# Patient Record
Sex: FEMALE | Race: WHITE | NOT HISPANIC OR LATINO | Employment: OTHER | URBAN - METROPOLITAN AREA
[De-identification: names, ages, dates, MRNs, and addresses within clinical notes are randomized per-mention and may not be internally consistent; named-entity substitution may affect disease eponyms.]

---

## 2017-10-06 ENCOUNTER — ALLSCRIPTS OFFICE VISIT (OUTPATIENT)
Dept: OTHER | Facility: OTHER | Age: 82
End: 2017-10-06

## 2017-10-06 DIAGNOSIS — I48.20 CHRONIC ATRIAL FIBRILLATION (HCC): ICD-10-CM

## 2017-10-06 DIAGNOSIS — R30.9 PAINFUL MICTURITION: ICD-10-CM

## 2017-10-07 NOTE — PROGRESS NOTES
Assessment  1  Late onset Alzheimer's disease without behavioral disturbance (331 0,294 10)   (G30 1,F02 80)   2  Chronic atrial fibrillation (427 31) (I48 2)   3  Hypertension, essential (401 9) (I10)   4  Seizure disorder (345 90) (G40 909)   5  Incontinence in female (625 6) (R32)    Plan  Chronic atrial fibrillation    · (1) CBC/PLT/DIFF; Status:Active; Requested for:06Oct2017;    · (1) COMPREHENSIVE METABOLIC PANEL; Status:Active; Requested OEB:[id number];    · (1) DIGOXIN; Status:Active; Requested for:06Oct2017;    · (1) PT WITH INR; Status:Active; Requested for:06Oct2017;    · (1) TSH WITH FT4 REFLEX; Status:Active; Requested for:06Oct2017;     Discussion/Summary  Discussion Summary:   Laboratory screen to be done today  vaccine today  visit June 2018 or earlier if needed  History of Present Illness  HPI: A new patient visit  female with profound Alzheimer's dementia  of:fibrillation, rate controlled and anticoagulated  Hypertension  Seizure disorder  All of these are controlled  patient is at home with her daughter and another caretaker  The patient is DNR but not the NH  The family will treat underlying disease not chest symptoms  to the family and the caretaker, the patient is still verbal with them although she is completely mute here  ROS is not obtainable from the patient  According to the family  There been no changes  Hypertension (Follow-Up): The patient presents for follow-up of essential hypertension  The patient states she has been stable with her blood pressure control since the last visit  Comorbid Illnesses: a stroke  She has no significant interval events  Symptoms: The patient is currently asymptomatic  Dementia (Follow-Up): Her dementia has But close to being end stage  , but been stable since the last visit  She has no comorbid illnesses  Symptoms: stable memory impairment,-denies agitation,-denies aggression-and-denies wandering  Associated Symptoms: no frequent falls     Home precautions: Dementia counseling and education was reviewed with patient and caregivers, including  Social Activities: The patient is currently living with family  Atrial Fibrillation (Follow-Up): The patient presents with permanent atrial fibrillation  The treatment strategy for this patient is rate control  She states her atrial fibrillation has been stable since the last visit  She has no comorbid illnesses  She has no significant interval events  Symptoms:       Seizure Disorder (Brief): The patient is being seen for a routine clinic follow-up of seizure disorder  Active Problems  1  Active advance directive (V49 89) (Z78 9)   2  Screening for genitourinary condition (V81 6) (Z13 89)    Past Medical History  1  History of atrial fibrillation (V12 59) (Z86 79)   2  History of dementia (V11 8) (Z86 59)    Surgical History  1  History of Surgery For Relief Of Elevated Intraocular Pressure    Family History  Mother    1  Family history of Diagnosis unknown  Father    2  Family history of Diagnosis unknown  Family History Reviewed: The family history was reviewed and updated today  Social History   · Active advance directive (V49 89) (Z78 9)   · Former cigarette smoker (V15 82) (A49 687)   · No illicit drug use   · Occasional alcohol consumption (V49 89) (Z78 9)    Allergies  1  Seafood    Vitals  Signs   Recorded: [id number] 01:26PM   Temperature: 97 5 F  Heart Rate: 86  Systolic: 914  Diastolic: 80  Height: 5 ft   Weight: 111 lb   BMI Calculated: 21 68  BSA Calculated: 1 45  O2 Saturation: 100    Physical Exam    Constitutional Elderly female seated in wheelchair, nonverbal    Eyes   Pupils and irises: Equal, round and reactive to light  Pulmonary   Respiratory effort: No increased work of breathing or signs of respiratory distress  Auscultation of lungs: Clear to auscultation      Cardiovascular   Auscultation of heart: Abnormal   The heart rate was normal  The rhythm was irregularly irregular  Heart sounds: abnormal S1 was diminished-and-abnormal S2 was diminished  A grade 3 systolic murmur was heard at the RUSB  A grade 2 systolic murmur was heard at the LUSB  Systolic ejection murmur noted right and left upper sternal borders, right greater than left  Examination of extremities for edema and/or varicosities: Normal     Musculoskeletal   Gait and station: Abnormal   Gait evaluation demonstrated non-weight bearing bilaterally-and-Nonambulatory here in the office  Psychiatric   Orientation to person, place, and time: Abnormal   Mental Status: disoriented to place,-disoriented to time,-decreased attention span,-decreased concentrating ability,-decreased visual attention,-difficulty naming common objects,-difficulty repeating a phrase,-difficulty writing a sentence,-inadequate knowledge of current events,-inadequate knowledge regarding past history-and-inadequate knowledge regarding vocabulary, but-oriented to person  The patient's speech exhibited aphasia          Results/Data  *VB - Urinary Incontinence Screen (Dx Z13 89 Screen for UI) [id number] 02:09PM Aguilar [place]     Test Name Result Flag Reference   Urinary Incontinence Assessment [id number]       Falls Risk Assessment (Dx Z13 89 Screen for Neurologic Disorder) [id number] 01:24PM Adry Mcclure     Test Name Result Flag Reference   Falls Risk      No falls in the past year       Signatures   Electronically signed by : Bettey Ahumada, M D ; Oct  6 2017  3:23PM EST                       (Author)

## 2017-11-11 ENCOUNTER — LAB CONVERSION - ENCOUNTER (OUTPATIENT)
Dept: OTHER | Facility: OTHER | Age: 82
End: 2017-11-11

## 2017-11-11 LAB
BACTERIA UR QL AUTO: ABNORMAL /HPF
BILIRUB UR QL STRIP: NEGATIVE
COLOR UR: ABNORMAL
COMMENT (HISTORICAL): CLEAR
FECAL OCCULT BLOOD DIAGNOSTIC (HISTORICAL): ABNORMAL
GLUCOSE (HISTORICAL): NEGATIVE
HYALINE CASTS #/AREA URNS LPF: ABNORMAL /LPF
KETONES UR STRIP-MCNC: NEGATIVE MG/DL
LEUKOCYTE ESTERASE UR QL STRIP: ABNORMAL
NITRITE UR QL STRIP: NEGATIVE
PH UR STRIP.AUTO: 6 [PH] (ref 5–8)
PROT UR STRIP-MCNC: ABNORMAL MG/DL
RBC (HISTORICAL): ABNORMAL /HPF
SP GR UR STRIP.AUTO: 1.02 (ref 1–1.03)
SQUAMOUS EPITHELIAL CELLS (HISTORICAL): ABNORMAL /HPF
WBC # BLD AUTO: ABNORMAL /HPF

## 2018-01-13 VITALS
HEIGHT: 60 IN | TEMPERATURE: 97.5 F | SYSTOLIC BLOOD PRESSURE: 150 MMHG | HEART RATE: 86 BPM | DIASTOLIC BLOOD PRESSURE: 80 MMHG | BODY MASS INDEX: 21.79 KG/M2 | WEIGHT: 111 LBS | OXYGEN SATURATION: 100 %

## 2018-05-21 RX ORDER — LEVETIRACETAM 250 MG/1
TABLET ORAL DAILY
COMMUNITY
End: 2018-07-29 | Stop reason: SDUPTHER

## 2018-05-21 RX ORDER — METOPROLOL SUCCINATE 25 MG/1
TABLET, EXTENDED RELEASE ORAL DAILY
COMMUNITY

## 2018-05-21 RX ORDER — LATANOPROST 50 UG/ML
SOLUTION/ DROPS OPHTHALMIC DAILY
COMMUNITY

## 2018-05-21 RX ORDER — WARFARIN SODIUM 4 MG/1
TABLET ORAL
COMMUNITY

## 2018-05-21 RX ORDER — WARFARIN SODIUM 2.5 MG/1
TABLET ORAL
COMMUNITY

## 2018-05-21 RX ORDER — DIGOXIN 125 MCG
TABLET ORAL
COMMUNITY

## 2018-05-21 RX ORDER — LISINOPRIL 40 MG/1
TABLET ORAL DAILY
COMMUNITY
End: 2018-10-07 | Stop reason: SDUPTHER

## 2018-05-22 ENCOUNTER — OFFICE VISIT (OUTPATIENT)
Dept: INTERNAL MEDICINE CLINIC | Facility: CLINIC | Age: 83
End: 2018-05-22
Payer: MEDICARE

## 2018-05-22 VITALS
SYSTOLIC BLOOD PRESSURE: 118 MMHG | TEMPERATURE: 96.9 F | HEART RATE: 71 BPM | OXYGEN SATURATION: 93 % | HEIGHT: 60 IN | DIASTOLIC BLOOD PRESSURE: 74 MMHG

## 2018-05-22 DIAGNOSIS — L89.45: ICD-10-CM

## 2018-05-22 DIAGNOSIS — R13.10 DYSPHAGIA, UNSPECIFIED TYPE: Primary | ICD-10-CM

## 2018-05-22 PROBLEM — I10 HYPERTENSION, ESSENTIAL: Status: ACTIVE | Noted: 2017-10-06

## 2018-05-22 PROBLEM — I48.20 CHRONIC ATRIAL FIBRILLATION (HCC): Status: ACTIVE | Noted: 2017-10-06

## 2018-05-22 PROBLEM — G40.909 SEIZURE DISORDER (HCC): Status: ACTIVE | Noted: 2017-10-06

## 2018-05-22 PROBLEM — R32 INCONTINENCE IN FEMALE: Status: ACTIVE | Noted: 2017-10-06

## 2018-05-22 PROBLEM — G30.1 LATE ONSET ALZHEIMER'S DISEASE WITHOUT BEHAVIORAL DISTURBANCE (HCC): Status: ACTIVE | Noted: 2017-10-06

## 2018-05-22 PROBLEM — F02.80 LATE ONSET ALZHEIMER'S DISEASE WITHOUT BEHAVIORAL DISTURBANCE (HCC): Status: ACTIVE | Noted: 2017-10-06

## 2018-05-22 PROCEDURE — 99214 OFFICE O/P EST MOD 30 MIN: CPT | Performed by: PHYSICIAN ASSISTANT

## 2018-05-22 NOTE — PROGRESS NOTES
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, left buttock, and left hip, unstageable (HCC)  -     Low Air Loss Alternating Pressure Pads    Other orders  -     digoxin (LANOXIN) 0 125 mg tablet; Take by mouth every other day    -     levETIRAcetam (KEPPRA) 250 mg tablet; Take by mouth daily    -     latanoprost (XALATAN) 0 005 % ophthalmic solution; Apply to eye daily    -     lisinopril (ZESTRIL) 40 mg tablet; Take by mouth daily    -     metoprolol succinate (TOPROL-XL) 25 mg 24 hr tablet; Take by mouth daily    -     warfarin (COUMADIN) 2 5 mg tablet; Take by mouth  -     warfarin (COUMADIN) 4 mg tablet; Take by mouth        No problem-specific Assessment & Plan notes found for this encounter  Subjective:      Patient ID: Magali Hernandez is a 80 y o  female  Trouble swallowing for several months  She chokes on thin liquids and has trouble swallowing food in the since cut up to very small pieces her appetite is good she does not complain about any pain  She has severe dementia poor communication  She does not walk mostly incontinent  The following portions of the patient's history were reviewed and updated as appropriate:   She has a past medical history of Atrial fibrillation ([address] ) and Dementia ,   does not have any pertinent problems on file  ,   has a past surgical history that includes Other surgical history  ,  family history includes No Known Problems in her father and mother  ,   reports that she has quit smoking  Her smoking use included Cigarettes   She has never used smokeless tobacco  She reports that she drinks alcohol  She reports that she does not use drugs  ,  has No Known Allergies     Current Outpatient Prescriptions   Medication Sig Dispense Refill    digoxin (LANOXIN) 0 125 mg tablet Take by mouth every other day        latanoprost (XALATAN) 0 005 % ophthalmic solution Apply to eye daily        levETIRAcetam (KEPPRA) 250 mg tablet Take by mouth daily        lisinopril (ZESTRIL) 40 mg tablet Take by mouth daily        metoprolol succinate (TOPROL-XL) 25 mg 24 hr tablet Take by mouth daily        warfarin (COUMADIN) 4 mg tablet Take by mouth      warfarin (COUMADIN) 2 5 mg tablet Take by mouth       No current facility-administered medications for this visit  Review of Systems   Constitutional: Negative for activity change, appetite change, chills, diaphoresis, fatigue, fever and unexpected weight change  HENT: Negative for congestion, dental problem, drooling, ear discharge, ear pain, facial swelling, hearing loss, nosebleeds, postnasal drip, rhinorrhea, sinus pain, sinus pressure, sneezing, sore throat, tinnitus, trouble swallowing and voice change  Eyes: Negative for photophobia, pain, discharge, redness, itching and visual disturbance  Respiratory: Negative for apnea, cough, choking, chest tightness, shortness of breath, wheezing and stridor  Cardiovascular: Negative for chest pain, palpitations and leg swelling  Gastrointestinal: Negative for abdominal distention, abdominal pain, anal bleeding, blood in stool, constipation, diarrhea, nausea, rectal pain and vomiting  Endocrine: Negative for cold intolerance, heat intolerance, polydipsia, polyphagia and polyuria  Genitourinary: Negative for decreased urine volume, difficulty urinating, dysuria, enuresis, flank pain, frequency, genital sores, hematuria and urgency     Musculoskeletal: Negative for arthralgias, back pain, gait problem, joint swelling, myalgias, neck pain and neck stiffness  Skin: Positive for wound  Negative for color change, pallor and rash  Neurological: Negative for dizziness, tremors, seizures, syncope, facial asymmetry, speech difficulty, weakness, light-headedness, numbness and headaches  Hematological: Negative for adenopathy  Does not bruise/bleed easily  Psychiatric/Behavioral: Positive for confusion and decreased concentration  Negative for agitation, behavioral problems, dysphoric mood, hallucinations, self-injury, sleep disturbance and suicidal ideas  The patient is not nervous/anxious and is not hyperactive  Objective:  Vitals:    05/22/18 1352   BP: 118/74   BP Location: Right arm   Patient Position: Sitting   Pulse: 71   Temp: (!) 96 9 °F (36 1 °C)   SpO2: 93%   Height: 5' (1 524 m)     There is no height or weight on file to calculate BMI  Physical Exam   Constitutional: She is oriented to person, place, and time  She appears well-developed  Reticent stiff in a wheelchair   HENT:   Head: Normocephalic  Right Ear: External ear normal    Left Ear: External ear normal    Mouth/Throat: Oropharynx is clear and moist    Eyes: Conjunctivae are normal  Pupils are equal, round, and reactive to light  Neck: Neck supple  No thyromegaly present  Cardiovascular: Normal rate, regular rhythm, normal heart sounds and intact distal pulses  No murmur heard  Pulmonary/Chest: Effort normal and breath sounds normal  No respiratory distress  She exhibits no tenderness  Abdominal: Soft  Bowel sounds are normal  She exhibits no distension and no mass  Musculoskeletal: Normal range of motion  Neurological: She is alert and oriented to person, place, and time  She has normal reflexes  She exhibits abnormal muscle tone  Skin: Skin is warm and dry  Pallor: Small redness excoriation left buttocks

## 2018-05-23 ENCOUNTER — TELEPHONE (OUTPATIENT)
Dept: INTERNAL MEDICINE CLINIC | Facility: CLINIC | Age: 83
End: 2018-05-23

## 2018-05-23 NOTE — TELEPHONE ENCOUNTER
Ashley Day was in yesterday  Needs order for mattress faxed over from office  Will not accept from patient   Please fax to # [phone]

## 2018-05-24 NOTE — TELEPHONE ENCOUNTER
Joseph Bateman from [facility] :    Did received the order for the mattress, but now needs  measurements for the wound!!! you can fax it to [phone]

## 2018-05-29 ENCOUNTER — TELEPHONE (OUTPATIENT)
Dept: INTERNAL MEDICINE CLINIC | Facility: CLINIC | Age: 83
End: 2018-05-29

## 2018-05-29 NOTE — TELEPHONE ENCOUNTER
PATIENT IS NOT ELIIGBLE FOR A  LOW AIR ALTERNATING PRESSURE MATTRESS BASED ON THE PATIENT MUST HAVE A STAGE 3 OR STAGE 4 PRESSURE ULCER  IN THE NOTES IT WAS STAGED AS A 1  PRESSURE ULCER    [address] [facility], [address] [facility] [phone]

## 2018-05-29 NOTE — TELEPHONE ENCOUNTER
Try to contact patient in re: to making an appointment for documentation on the request made for the special mattress, as per Dr Octavio Driscoll, there was no answer and message could not be left due to a full mailbox

## 2018-05-30 DIAGNOSIS — L89.151 DECUBITUS ULCER OF SACRAL REGION, STAGE 1: Primary | ICD-10-CM

## 2018-05-30 NOTE — TELEPHONE ENCOUNTER
Caitie Wynne called to see about the status of the air mattress    Caitie Wynne was in last week and saw Nisreen Ran Wynne said she's not brining in her mother again        Please call her back at #[phone]

## 2018-05-30 NOTE — TELEPHONE ENCOUNTER
Please tell on that  The degree of break down on the exam the [employer] documented does not support getting the air mattress paid for  They will have to pay for it themselves

## 2018-05-30 NOTE — TELEPHONE ENCOUNTER
See message below, pt is not eligible for low air loss alternating pressure mattress bc ulcer is not stage 3 or 4    Stage 1 is not eligible

## 2018-05-31 ENCOUNTER — HOSPITAL ENCOUNTER (OUTPATIENT)
Dept: RADIOLOGY | Facility: HOSPITAL | Age: 83
Discharge: HOME | End: 2018-05-31
Payer: MEDICARE

## 2018-05-31 DIAGNOSIS — R13.10 DYSPHAGIA, UNSPECIFIED TYPE: ICD-10-CM

## 2018-05-31 PROCEDURE — G8996 SWALLOW CURRENT STATUS: HCPCS

## 2018-05-31 PROCEDURE — 74230 X-RAY XM SWLNG FUNCJ C+: CPT

## 2018-05-31 PROCEDURE — G8998 SWALLOW D/C STATUS: HCPCS

## 2018-05-31 PROCEDURE — G8997 SWALLOW GOAL STATUS: HCPCS

## 2018-05-31 PROCEDURE — 92611 MOTION FLUOROSCOPY/SWALLOW: CPT

## 2018-05-31 NOTE — PROCEDURES
Speech-Language Pathology Video Barium Swallow Study        Patient Name: Reed Sullivan    AEKDP'E Date: 5/31/2018     Problem List  Patient Active Problem List   Diagnosis    [facility]    Hypertension, essential    Incontinence in female    Late onset Alzheimer's disease without behavioral disturbance    Seizure disorder [facility])       Past Medical History  Past Medical History:   Diagnosis Date    Atrial fibrillation ([facility] [address] )     Dementia        Past Surgical History  Past Surgical History:   Procedure Laterality Date    OTHER SURGICAL HISTORY      SURERY FOR RELIEF OF ELEVATED INTRAOCULAR PRESSURE         General Information;  Pt is a 80 y o  female who was referred to [facility]  for a VBS for evaluation of reported worsening dysphagia for several months per family and MD report  Patients daughter and aid were present today and report they have been cutting up her food very small but that she chokes and coughs on thin liquids and at times on solids  They report she has had a good appetite and deny any recent weight loss or PNA that they know of  No CXR noted in EPIC  History is significant for severe dementia  Swallow evaluation was order to further assess the swallow and rule out aspiration       Swallow Information   Current Risks for Dysphagia & Aspiration: reported new dysphagia     Current Symptoms/Concerns: coughing with po and choking incident    Current Diet: soft/level 3 diet and thin liquids    Baseline Assessment   Behavior/Cognition: lethargic, waxing and waning arousal level, low alertness level and requires cues to maintain arousal ( eyes closed)    Speech/Language Status: not able to to follow commands and no verbal output noted    Patient Positioning: Laterally at 90 degrees      Speech/Swallow Mech:   Facial: symmetrical  Labial: unable to test 2/2 limited command following  Lingual: unable to test 2/2 limited command following  Velum: symmetrical  Mandible: unable to test 2/2 limited command following  Dentition: limited dentition  Vocal quality:patient is nonverbal   Volitional Cough: unable to initiate volitional cough 2/2 limited command following  Respiratory: RA    Previous VBS: none noted in EPIC    Consistencies Assessed and Performance   Pt was seen in radiology for a Video Barium Swallow Study, seated in the upright position and viewed laterally with the following consistencies:      Administered: nectar thick, honey thick, puree and mechanical soft solids  Specific materials administered: Barium laden applesauce, jeremiah cracker, honey thick, nectar thick liquids, and 13mm barium pill  Liquids were administered by cup and straw  Patient was fed by ST- she is not able to feed herself 2' dementia  Results are as follows:     **Images are available for review on PACS              Oral stage; Patient with reduced labial closure with cup sip and reduced spoon stripping requiring ST to utilize upper lip at times  Mastication was mildly  reduced and prolonged with soft solids  Bolus formation and transfer were reduced with decreased A/P transit and lingual pumping noted at times  She was noted to have piecemeal deglutition with soft solids ( requiring 2-3 swallows at times to clear)  She was also noted to have decreased base of tongue retraction and premature spillage with all consistencies- see below  Min oral stasis observed post swallow  Pharyngeal stage; Patient with premature spillage to the valleculae with puree, along the aryepiglottic folds with HTL via tsp, and the to pyriform sinuses with soft solids, HTL via cup and NTL via tsp and cup  Swallowing initiation was mild-moderately delayed  Laryngeal rise was [facility]  During presentations of soft solids she was noted to fall asleep and required verbal and tactile stimulation to rouse and swallow   Subsequently she was noted to have silent aspiration- this aspiration event was not viewed directly as it occurred when the imaging was off however she was noted to have material along the anterior tracheal wall when the imaging was turned on for the next presentation  With HTL via tsp she was noted to have epiglottic undercoating and spill along the aryepiglottic folds with transient high penetration  With HTL via cup sip and NTL via tsp sip she was noted to have spill to the pyriform sinuses and transient high penetration  With NTL via cup sip she was noted to have spill to the pyriform sinuses and subsequent silent aspiration  No initial response to aspiration however patient was noted to have delayed cough when matieral reached lower trachea  Strategies and Efficacy: Patient unable to follow directions however she is fed at home therefore rate/vol can be controlled which did prove to be beneficial to this patient  She also benefited from verbal and tactile cues to maintain arousal throughout intake  Esophageal stage; Not assessed  Assessment Summary; Pt presents with moderate-severe oropharyngeal dysphagia as described above  Although she is noted with weakness, reduced control, and generally prolonged swallow process her alertness level is significantly impacting the safety of her swallow  She was noted to fall asleep intermittently throughout today's exam resulting in aspiration  The severity of her dysphagia was noted to be inconsistent primarily due to her reduced alertness level with lack of awareness at times of po  She was noted to have silent aspiration with NTL via cup sip  She was also noted to have silent aspiration with soft solids when she fell asleep  Penetration was observed with all liquids  She benefited from controlled rate/vol of intake and use of verbal and tactile cues to maintain arousal  She presents with a high risk for aspiration at this time with regular solids and thin liquids   She present with mild-moderately increased risk for aspiration with all po intake due to her alertness level  Aspiration risk is reduced ( but not eliminated) with modified textures and use of strategies at this time  Diagnosis/Prognosis;  moderate-severe oropharyngeal dysphagia  fair prognosis for continued safe po intake   Prognosis Considerations: age, medical status, cognitive status, progressive disease process and alertness level    Recommendations; Recommend mechanically altered/level 2 diet and nectar thick liquids via tsp, with upright posture, only feed when alert, slow rate of feeding, small bites/sips, liquids by teaspoon only, alternating bites and sips and allow additional time between presentations for extraw swallows, provide cues throughout to maintain arousal    Recommended Form of Meds: crushed with puree   Full Supervision and Feed 1:1  Aspiration precautions   Reflux Precautions    Results reviewed in detail with family and aid including results of test, diet recommendations, safe swallowing strategies, food modification and where to get thickener as well as recommendations for follow up  Reciprocal comprehension was verbally expressed  Goals:  Pt will tolerate dysph 2 textures with NTL via tsp without s/s of aspiration x3     Family/caregiver will demonstrate accurate use of safe swallowing strategies (controlled rate/vol of intake with cues for alertness and additional time for extra swallows between presentations) in  90% of opps  Treatment Recommendations: Recommend follow up with home care/outpatient SLP for 1-5 sessions for continued education/training in aspiration/reflux precautions, safe swallowing strategies and diet management        Discharge recommendation: outpt or home health    CHUCK Cintron S , [address] Pathologist   [id number]

## 2018-05-31 NOTE — TELEPHONE ENCOUNTER
Spoke w/ Nupur Arriaga and notified of this and she said they will work on taking care of the wound instead of getting mattress

## 2018-06-05 ENCOUNTER — TELEPHONE (OUTPATIENT)
Dept: INTERNAL MEDICINE CLINIC | Facility: CLINIC | Age: 83
End: 2018-06-05

## 2018-06-05 NOTE — TELEPHONE ENCOUNTER
Obinna Matthews [place] [address] of revenue called needing a second dx code on pt's barium swallow test  States the dx code that was use (R13 10) always needs a second dx code when used  Some codes that she recommended were:  R68 89-Other general symptoms and signs  R63  3-Difficulty feeding    Please add new dx code to pt's problem list then call Obinna Matthews when finished     [phone] (ok to leave message)

## 2018-06-06 NOTE — TELEPHONE ENCOUNTER
Very abnormal swallowing study  Dysphagia due to severe dementia  High risk of aspiration pneumonia  Recommended strategy is thickened liquids    However, this is really a marker for progression of dementia

## 2018-07-29 DIAGNOSIS — G40.909 SEIZURE DISORDER (HCC): Primary | ICD-10-CM

## 2018-07-30 RX ORDER — LEVETIRACETAM 250 MG/1
TABLET ORAL
Qty: 180 TABLET | Refills: 2 | Status: SHIPPED | OUTPATIENT
Start: 2018-07-30

## 2018-08-06 ENCOUNTER — TELEPHONE (OUTPATIENT)
Dept: INTERNAL MEDICINE CLINIC | Facility: CLINIC | Age: 83
End: 2018-08-06

## 2018-08-06 NOTE — TELEPHONE ENCOUNTER
Patients daughter Jackie Kerr has some concern not sure what to do       Patient is in the last stage of alzheimer's, for the last 3 days she is having trouble eating and a hard time swallowing, should she bring her in or is that a part of the process?

## 2018-08-07 NOTE — TELEPHONE ENCOUNTER
Spoke w/ daughter and notified  Asking what she should do for this? Said when she gives her pureed food she coughs and is wheezing at times, no trouble breathing, thinks she may have aspirated  Also has cardio apt on Friday, said nothing has changed and gets bw for inr every 2 weeks so asking is this necessary to go?

## 2018-10-03 DIAGNOSIS — E78.5 HYPERLIPIDEMIA, UNSPECIFIED HYPERLIPIDEMIA TYPE: Primary | ICD-10-CM

## 2018-10-03 RX ORDER — LISINOPRIL 40 MG/1
40 TABLET ORAL DAILY
Qty: 90 TABLET | Refills: 0 | OUTPATIENT
Start: 2018-10-03

## 2018-10-04 NOTE — TELEPHONE ENCOUNTER
CALLED NUMBER PROVIDED AND LMOMTCB, DAUGHTER CALLED BACK AND STATED SHE IS STILL TAKING THE 40MG OF LISINOPRIL AND THIS WAS UNDER DR Ronnell [place] NOW NEEDS TO BE UNDER DR Loki Carnes -PLEASE SEND TO [facility] IN [place]

## 2018-10-07 DIAGNOSIS — I10 ESSENTIAL HYPERTENSION: Primary | ICD-10-CM

## 2018-10-08 RX ORDER — LISINOPRIL 40 MG/1
TABLET ORAL
Qty: 90 TABLET | Refills: 3 | Status: SHIPPED | OUTPATIENT
Start: 2018-10-08

## 2018-10-22 ENCOUNTER — TELEPHONE (OUTPATIENT)
Dept: INTERNAL MEDICINE CLINIC | Facility: CLINIC | Age: 83
End: 2018-10-22

## 2018-10-22 NOTE — TELEPHONE ENCOUNTER
CHECKING THE STATUS A FORM WAS FAXED 10/16/2018 FOR PT [employer] SUPPLIES WANTED TO KNOW IF IT WAS RECEIVED AND DONE?

## 2018-10-29 ENCOUNTER — OFFICE VISIT (OUTPATIENT)
Dept: INTERNAL MEDICINE CLINIC | Facility: CLINIC | Age: 83
End: 2018-10-29
Payer: MEDICARE

## 2018-10-29 ENCOUNTER — TELEPHONE (OUTPATIENT)
Dept: INTERNAL MEDICINE CLINIC | Facility: CLINIC | Age: 83
End: 2018-10-29

## 2018-10-29 VITALS — SYSTOLIC BLOOD PRESSURE: 100 MMHG | HEART RATE: 100 BPM | OXYGEN SATURATION: 95 % | DIASTOLIC BLOOD PRESSURE: 62 MMHG

## 2018-10-29 DIAGNOSIS — S01.21XD NASAL LACERATION, SUBSEQUENT ENCOUNTER: Primary | ICD-10-CM

## 2018-10-29 DIAGNOSIS — L89.150 PRESSURE INJURY OF SACRAL REGION, UNSTAGEABLE (HCC): ICD-10-CM

## 2018-10-29 PROCEDURE — 99213 OFFICE O/P EST LOW 20 MIN: CPT | Performed by: INTERNAL MEDICINE

## 2018-10-29 RX ORDER — TRAMADOL HYDROCHLORIDE 50 MG/1
TABLET ORAL
Refills: 0 | COMMUNITY
Start: 2018-10-24

## 2018-10-29 NOTE — PROGRESS NOTES
Assessment/Plan:       Diagnoses and all orders for this visit:    Nasal laceration, subsequent encounter    Pressure injury of sacral region, unstageable ([address] )    Other orders  -     traMADol (ULTRAM) 50 mg tablet; 1 TAB EVERY 8HRS AS NEEDED DON'T DRIVE/OPERATE MACHINERY W/MED DON'T DRINK/OR TAKE OTHER SEDATIVE          Patient Instructions   Bilateral unstageable sacral pressure ulcers: Air mattress, soft care cushion, Allevyn dressing        Subjective:      Patient ID: Steve Shirley is a 80 y o  female  79 y/o female who presents today for suture removal     She fell out of her wheelchair last Tuesday and was seen in the ER, where 3 sutures were placed on the bridge of her nose  Her caretakers were told to have the sutures removed within 5-7 days after being put in  She presents with generalized bruising and a healing laceration on the bridge of her nose with 3 sutures intact  She also presents with bilateral unstageable sacral pressure ulcers  There is an Allevyn dressing on the wounds  Underneath the dressing, there are two pressure ulcers on each side of the sacrum that appear to be draining/sloughing  Wound bed is unable to be seen because the wounds are covered with yellow slough  The following portions of the patient's history were reviewed and updated as appropriate:   She has no past medical history on file  ,   does not have any pertinent problems on file  ,   has a past surgical history that includes Other surgical history  ,  family history includes No Known Problems in her father and mother  ,   reports that she has never smoked  She has never used smokeless tobacco  She reports that she does not drink alcohol or use drugs  ,  has No Known Allergies     Current Outpatient Prescriptions   Medication Sig Dispense Refill    digoxin (LANOXIN) 0 125 mg tablet Take by mouth every other day        latanoprost (XALATAN) 0 005 % ophthalmic solution Apply to eye daily        levETIRAcetam (KEPPRA) 250 mg tablet TAKE 1 TABLET TWICE DAILY 180 tablet 2    lisinopril (ZESTRIL) 40 mg tablet TAKE 1 TABLET BY MOUTH ONCE DAILY 90 tablet 3    metoprolol succinate (TOPROL-XL) 25 mg 24 hr tablet Take by mouth daily        traMADol (ULTRAM) 50 mg tablet 1 TAB EVERY 8HRS AS NEEDED DON'T DRIVE/OPERATE MACHINERY W/MED DON'T DRINK/OR TAKE OTHER SEDATIVE  0    warfarin (COUMADIN) 2 5 mg tablet Take by mouth      warfarin (COUMADIN) 4 mg tablet Take by mouth       No current facility-administered medications for this visit  Review of Systems   Constitutional: Negative for activity change  Skin: Positive for wound  Negative for rash  Objective:  Vitals:    10/29/18 0923   BP: 100/62   Pulse: 100   SpO2: 95%      Physical Exam   Constitutional: Vital signs are normal  She appears cachectic  She is cooperative  No distress  Pt appears her stated age  She is sitting in her wheelchair with her eyes closed and not interacting or speaking  HENT:   Head: Normocephalic  Nose: Nose lacerations present  Abdominal: Soft  Normal appearance  Neurological: She is alert  Skin: Skin is warm and dry  Bruising (generalized), ecchymosis and laceration (bridge of nose) noted  She is not diaphoretic  Psychiatric: Her affect is labile  She is withdrawn  Cognition and memory are impaired  She is noncommunicative

## 2018-10-29 NOTE — TELEPHONE ENCOUNTER
Patient's daughter (Also named lisa) called- they were in this morning to see Dr Hany Traylor and they were given an order for an air mattress to help with her bed sores  They took it to the medical supply store-[facility] and the store needs more details  Please fax over the order for the air mattress and clinicals stating the need for the air mattress-why it was ordered and how bad the bed sores are  Phone# Patient's daughter didn't know it     Fax# [phone]    Lisa's (Daughter's) #[phone]

## 2018-11-06 NOTE — TELEPHONE ENCOUNTER
DAUGHTER  CALLED  BACK  SAID  SPOKE  TO [facility]  AND  TOLD  IF  FAX  DOESN'T  WORK WE  SHOULD  THEM  CALL THEM  WITH  ORDER   [facility]  [phone]   ANY  QUESTIONS  Beltran Cash   HER  DAUGHTER   [phone]

## 2018-11-12 ENCOUNTER — TELEPHONE (OUTPATIENT)
Dept: INTERNAL MEDICINE CLINIC | Facility: CLINIC | Age: 83
End: 2018-11-12

## 2018-11-12 NOTE — TELEPHONE ENCOUNTER
He is checking to see if you received the order for Wed  Alternating pressure pad ??? Faxed last Wed and re-sent just now

## 2018-11-12 NOTE — TELEPHONE ENCOUNTER
PT  DAUGHTER  CALLED  WANTS  TO MAKE SURE  THE  ORDER   GETS  FAXED  TO  [phone]  FOR  [address] [place] [address]  ANY   QUESTIONS  CALL DAUGHTER  [phone]

## 2018-11-15 NOTE — TELEPHONE ENCOUNTER
[facility] called back and said they didn't receive the fax and asked that it be re faxed to [phone]  Attn: Abida Heredia

## 2018-11-19 ENCOUNTER — TELEPHONE (OUTPATIENT)
Dept: INTERNAL MEDICINE CLINIC | Facility: CLINIC | Age: 83
End: 2018-11-19

## 2018-11-19 NOTE — TELEPHONE ENCOUNTER
Pts daughter, John Anton, cld stating that she spoke with the office of Aging and they are requesting a referral for a home hospice nurse to come in and evaluate pt for hosice care  Please call John Walton [phone]

## 2018-11-21 ENCOUNTER — TELEPHONE (OUTPATIENT)
Dept: INTERNAL MEDICINE CLINIC | Facility: CLINIC | Age: 83
End: 2018-11-21

## 2018-11-21 NOTE — TELEPHONE ENCOUNTER
[facility] received the order for the pressure pads but needs the order also for the wheelchair cushions  Any questions call [phone] ext [phone]  Trying to get supplies to patient

## 2018-11-27 NOTE — TELEPHONE ENCOUNTER
Carlos A from [email or web address] called again  They never received the order for the wheelchair cushion  Can we refax  NPN#[phone]     Please call Milagros Kendall when we fax it so they renee look for it  Danyel Bullard having a hard time locating it     PH#[phone]

## 2018-12-03 ENCOUNTER — TELEPHONE (OUTPATIENT)
Dept: INTERNAL MEDICINE CLINIC | Facility: CLINIC | Age: 83
End: 2018-12-03

## 2018-12-04 NOTE — TELEPHONE ENCOUNTER
Petra Michel is checking to see if the Death Certificate is ready yet  Andrew Adams He asked that it be "slipped in between patients" so it can get done ASAP  Andrew Adams Please call and let him know if he can come & pick it up